# Patient Record
Sex: MALE | Race: OTHER | Employment: OTHER | URBAN - METROPOLITAN AREA
[De-identification: names, ages, dates, MRNs, and addresses within clinical notes are randomized per-mention and may not be internally consistent; named-entity substitution may affect disease eponyms.]

---

## 2023-05-27 ENCOUNTER — HOSPITAL ENCOUNTER (EMERGENCY)
Facility: HOSPITAL | Age: 43
Discharge: HOME | End: 2023-05-27
Attending: EMERGENCY MEDICINE
Payer: MEDICAID

## 2023-05-27 VITALS
DIASTOLIC BLOOD PRESSURE: 93 MMHG | WEIGHT: 170 LBS | SYSTOLIC BLOOD PRESSURE: 156 MMHG | RESPIRATION RATE: 16 BRPM | HEIGHT: 69 IN | HEART RATE: 70 BPM | BODY MASS INDEX: 25.18 KG/M2 | TEMPERATURE: 97 F | OXYGEN SATURATION: 100 %

## 2023-05-27 DIAGNOSIS — S03.00XA CLOSED DISLOCATION OF MANDIBLE, INITIAL ENCOUNTER: Primary | ICD-10-CM

## 2023-05-27 PROCEDURE — 99284 EMERGENCY DEPT VISIT MOD MDM: CPT

## 2023-05-27 PROCEDURE — 21480 CLTX TMPRMAND DISLC 1ST/SBSQ: CPT

## 2023-05-27 RX ORDER — IBUPROFEN 600 MG/1
600 TABLET ORAL ONCE
Status: DISCONTINUED | OUTPATIENT
Start: 2023-05-27 | End: 2023-05-27

## 2023-05-27 RX ORDER — IBUPROFEN 600 MG/1
600 TABLET ORAL EVERY 8 HOURS PRN
Qty: 30 TABLET | Refills: 0 | Status: SHIPPED | OUTPATIENT
Start: 2023-05-27

## 2023-05-27 NOTE — ED INITIAL ASSESSMENT (HPI)
Pt ambulatory to ed from home. Pt states states he took a big yawn and his jaw popped.  Per daughter this has happed 2 other times in the past.

## 2025-07-09 ENCOUNTER — HOSPITAL ENCOUNTER (OUTPATIENT)
Age: 45
Discharge: HOME | End: 2025-07-09
Payer: COMMERCIAL

## 2025-07-09 ENCOUNTER — APPOINTMENT (OUTPATIENT)
Dept: GENERAL RADIOLOGY | Age: 45
End: 2025-07-09
Attending: NURSE PRACTITIONER
Payer: COMMERCIAL

## 2025-07-09 VITALS
TEMPERATURE: 98 F | SYSTOLIC BLOOD PRESSURE: 133 MMHG | HEART RATE: 70 BPM | OXYGEN SATURATION: 99 % | DIASTOLIC BLOOD PRESSURE: 82 MMHG | RESPIRATION RATE: 18 BRPM

## 2025-07-09 DIAGNOSIS — S62.662B OPEN NONDISPLACED FRACTURE OF DISTAL PHALANX OF RIGHT MIDDLE FINGER, INITIAL ENCOUNTER: Primary | ICD-10-CM

## 2025-07-09 PROCEDURE — 73140 X-RAY EXAM OF FINGER(S): CPT | Performed by: NURSE PRACTITIONER

## 2025-07-09 PROCEDURE — 99203 OFFICE O/P NEW LOW 30 MIN: CPT | Performed by: NURSE PRACTITIONER

## 2025-07-09 RX ORDER — CEFADROXIL 500 MG/1
500 CAPSULE ORAL 2 TIMES DAILY
Qty: 20 CAPSULE | Refills: 0 | Status: SHIPPED | OUTPATIENT
Start: 2025-07-09 | End: 2025-07-19

## 2025-07-09 NOTE — ED PROVIDER NOTES
Patient Seen in: [facility]    History   CC: finger injury  HPI: Daljit Hannon 44 year old male  who presents c/o right 3rd finger injury in which pt states he was using a drill yesterday and accidentally screwed a screw into his right 3rd lateral fingertip and exited thru the middle of the nail. States he removed the screw himself but came today as swelling and pain worsened. Denies numbness/tingling/weakness or limitation in rom associated. Denies current bleeding or fever. No pain/injury elsewhere associated. Last Tetanus unknown.    Past Medical History[1]    Past Surgical History[2]    Family History[3]    Short Social Hx on File[4]    ROS:  Systems reviewed: All pertinent positives noted in HPI. Unless otherwise noted, additional systems reviewed are negative.   Vital signs reviewed.    Positive for stated complaint: finger injury  Other systems are as noted in HPI.  Constitutional and vital signs reviewed.      All other systems reviewed and negative except as noted above.    PSFH elements reviewed from today and agreed except as otherwise stated in HPI.             Constitutional and vital signs reviewed.        Physical Exam     ED Triage Vitals [07/09/25 0851]   /82   Pulse 70   Resp 18   Temp 98.3 °F (36.8 °C)   Temp src Oral   SpO2 99 %   O2 Device None (Room air)       Current:/82   Pulse 70   Temp 98.3 °F (36.8 °C) (Oral)   Resp 18   SpO2 99%         PE:  General - Appears well, non-toxic and in NAD  Head - Appears symmetrical without deformity/swelling cranium, scalp, or facial bones  Skin - +there are two small scabbed wounds noted to the right 3rd finger, one to the lateral finger pad and one thru the center of the dorsal nailbed. +mild edema noted to the right distal 3rd finger without dx or fluctuance. Skin is pink warm and dry throughout, mmm, cap refill <2seconds distal right hand digits  Neuro - A&O x4, sensation equal to both medial and lateral aspects of right  hand digits, steady gait  MSK - makes purposeful movements of all right hand digits with full ROM noted, finger flexion/extension strength equal bilat, radial pulses 2+ bilat.  +tenderness with palpation to the right 3rd finger overlying distal phalanx. No PIP joint, mid-distal, or proximal finger tenderness of the right 3rd finger.  Psych - Interactive and appropriate      ED Course   Labs Reviewed - No data to display    MDM     XR FINGER(S) (MIN 2 VIEWS), RIGHT 3RD (CPT=73140)   Final Result   PROCEDURE: XR FINGER(S) (MIN 2 VIEWS), RIGHT 3RD (CPT=73140)      INDICATION: screwed a screw into his distal finger yesterday       PATIENT STATED HISTORY: Injury x 1 day, screwd a nail though distal    plalanx of right 3rd finger and he pulled the nail out. Pain at distal    phalanx      COMPARISON:         TECHNIQUE:      FINDINGS:            =====   CONCLUSION: 2 mm radiopaque density seen along the nailbed of the middle    finger. Differentials include nondisplaced fracture versus radiopaque    foreign body.      No displaced fracture.      Soft tissue swelling within the distal aspect of the middle finger.         Electronically Verified and Signed by Attending Radiologist: Tee Rosenberg MD    7/9/2025 9:24 AM   Workstation: [employer]          DDx: Fracture versus foreign body versus cellulitis versus felon versus puncture wound without complication    X-ray results as noted above and independently reviewed by this provider.  Discussed possibility of fracture versus foreign body.  Patient is unsure of possibility of foreign body as he removed the screw from his finger and did not check to see if it was intact.  He verified with his PCP office however that he received a tetanus booster in 2023.  He is up-to-date.  Finger splint placed by MA and wound care instructions reviewed, antibiotic instructions and precautions reviewed, close follow-up with hand specialist and strict return/ED precautions reviewed. Patient is  historian and demonstrates understanding of all instruction and agrees with plan of care.    A finger splint was applied. After application of the splint I returned and re-examined the patient. The splint was adequately immobilizing the joint and distal to the splint the patient's circulation and sensation was intact.    Disposition and Plan     Clinical Impression:  1. Open nondisplaced fracture of distal phalanx of right middle finger, initial encounter        Disposition:  Discharge    Follow-up:  Pardeep Edwards MD  [address] [facility] [address]  [phone]    Go in 1 week        Medications Prescribed:  Current Discharge Medication List        START taking these medications    Details   cefadroxil 500 MG Oral Cap Take 1 capsule (500 mg total) by mouth 2 (two) times daily for 10 days.  Qty: 20 capsule, Refills: 0                      [1]   Past Medical History:   Hyperlipidemia   [2] History reviewed. No pertinent surgical history.  [3] No family history on file.  [4]   Social History  Socioeconomic History    Marital status:    Tobacco Use    Smoking status: Never    Smokeless tobacco: Never

## 2025-07-09 NOTE — ED INITIAL ASSESSMENT (HPI)
Patient reports nail to right 3rd digit yesterday which he pulled out. States recently had tetanus shot through pcp

## 2025-07-09 NOTE — DISCHARGE INSTRUCTIONS
Leave the finger splint on at all times unless you are bathing or washing your hands as a splint cannot get wet at because it can get moldy.  Take the full course of antibiotics as prescribed.  Rest from exacerbating activities for the next week. Apply ice/cold compress for 20min at a time 4-6x daily for the next 2-3 days. Keep the right hand elevated when resting as much as possible. You may take Motrin every 6 hours as needed for pain. Take this with food. If additional pain control is needed, you may also take Tylenol every 6 hours. Follow up with the hand specialist provided in your discharge instructions in the next week. Seek additional care in the ER for new or worsening symptoms, fever or increasing pain.